# Patient Record
Sex: MALE | URBAN - METROPOLITAN AREA
[De-identification: names, ages, dates, MRNs, and addresses within clinical notes are randomized per-mention and may not be internally consistent; named-entity substitution may affect disease eponyms.]

---

## 2021-12-15 ENCOUNTER — OFFICE VISIT (OUTPATIENT)
Dept: URBAN - METROPOLITAN AREA CLINIC 30 | Facility: CLINIC | Age: 61
End: 2021-12-15
Payer: COMMERCIAL

## 2021-12-15 PROCEDURE — 92134 CPTRZ OPH DX IMG PST SGM RTA: CPT | Performed by: STUDENT IN AN ORGANIZED HEALTH CARE EDUCATION/TRAINING PROGRAM

## 2021-12-15 PROCEDURE — 99203 OFFICE O/P NEW LOW 30 MIN: CPT | Performed by: STUDENT IN AN ORGANIZED HEALTH CARE EDUCATION/TRAINING PROGRAM

## 2021-12-15 ASSESSMENT — INTRAOCULAR PRESSURE
OS: 10
OD: 14

## 2021-12-15 NOTE — IMPRESSION/PLAN
Impression: Iridocyclitis: H20.9
- 2' to trauma with tennis ball x 12/11/21
- Rx'd prednisolone by ED/eye care provider at [address] x 12/13/21
- floaters but pt denies photopsia or other vision changes Plan: Pt ed findings and cause of symptoms. Cont prednisolone qid x 1 wk then taper 3x/day x 3 days, 2x/day for 2 days then 1x/day for 1 day then d/c. Okay to cont taking tyenol for pain management. 

RTC 3-4 wk for gonio/pupil check (asym dilation in office today)

## 2021-12-15 NOTE — IMPRESSION/PLAN
Impression: Other vitreous opacities, left eye: H43.392.
- OCT mac normal OS (impending PVD OD) Plan: Reassured pt floaters normal, no retinal break today. Pt ed symptoms of RD and if noted to seek care immediately.

## 2022-01-07 ENCOUNTER — OFFICE VISIT (OUTPATIENT)
Dept: URBAN - METROPOLITAN AREA CLINIC 30 | Facility: CLINIC | Age: 62
End: 2022-01-07
Payer: COMMERCIAL

## 2022-01-07 DIAGNOSIS — H20.9 IRIDOCYCLITIS: Primary | ICD-10-CM

## 2022-01-07 PROCEDURE — 92020 GONIOSCOPY: CPT | Performed by: OPTOMETRIST

## 2022-01-07 PROCEDURE — 99213 OFFICE O/P EST LOW 20 MIN: CPT | Performed by: OPTOMETRIST

## 2022-01-07 ASSESSMENT — INTRAOCULAR PRESSURE
OS: 19
OD: 19

## 2022-01-07 NOTE — IMPRESSION/PLAN
Impression: Iridocyclitis: H20.9
- 2' to trauma with tennis ball x 12/11/21
- Rx'd prednisolone by ED/eye care provider at [address] x 12/13/21
- floaters but pt denies photopsia or other vision changes Plan: Resolved. Pt ed on condition. Cont AT's QID OU. Finished prednisolone Monitor for any recurrence of iritis.

## 2022-01-07 NOTE — IMPRESSION/PLAN
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 Plan: Mydriatic OS second to trauma. -APD. Pt ed. Allow time for Iris to return to normal function. Current pupil findings: bright OD 3.5 and OS 5, dim OD 5 and OS 6.5.

## 2022-02-22 ENCOUNTER — OFFICE VISIT (OUTPATIENT)
Dept: URBAN - METROPOLITAN AREA CLINIC 30 | Facility: CLINIC | Age: 62
End: 2022-02-22
Payer: COMMERCIAL

## 2022-02-22 DIAGNOSIS — H20.042: ICD-10-CM

## 2022-02-22 DIAGNOSIS — H57.02 ANISOCORIA: Primary | ICD-10-CM

## 2022-02-22 DIAGNOSIS — H43.392 OTHER VITREOUS OPACITIES, LEFT EYE: ICD-10-CM

## 2022-02-22 DIAGNOSIS — H25.813 COMBINED FORMS OF AGE-RELATED CATARACT, BILATERAL: ICD-10-CM

## 2022-02-22 PROCEDURE — 99213 OFFICE O/P EST LOW 20 MIN: CPT | Performed by: OPTOMETRIST

## 2022-02-22 PROCEDURE — 92020 GONIOSCOPY: CPT | Performed by: OPTOMETRIST

## 2022-02-22 PROCEDURE — 92134 CPTRZ OPH DX IMG PST SGM RTA: CPT | Performed by: OPTOMETRIST

## 2022-02-22 ASSESSMENT — INTRAOCULAR PRESSURE
OS: 16
OD: 16

## 2022-02-22 ASSESSMENT — VISUAL ACUITY
OS: 20/30
OD: 20/25

## 2022-02-22 NOTE — IMPRESSION/PLAN
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
- 2' to trauma with tennis ball x 12/11/21
- Rx'd prednisolone by ED/eye care provider at [address] x 12/13/21
- floaters but pt denies photopsia or other vision changes Plan: Resolved. No angle recession. Finished prednisolone Monitor for any recurrence of iritis.

## 2022-02-22 NOTE — IMPRESSION/PLAN
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 Plan: Mydriatic OS second to trauma. -APD. Pt ed. Allow time for Iris to return to normal function. Previous pupil findings: bright OD 3.5 and OS 5, dim OD 5 and OS 6.5. Resolved per pt. Unable to assess pupils prior to dilation.  Will recheck at annual exam.